# Patient Record
Sex: MALE | Race: WHITE
[De-identification: names, ages, dates, MRNs, and addresses within clinical notes are randomized per-mention and may not be internally consistent; named-entity substitution may affect disease eponyms.]

---

## 2019-07-07 ENCOUNTER — HOSPITAL ENCOUNTER (EMERGENCY)
Dept: HOSPITAL 89 - ER | Age: 69
Discharge: HOME | End: 2019-07-07
Payer: SELF-PAY

## 2019-07-07 VITALS — SYSTOLIC BLOOD PRESSURE: 137 MMHG | DIASTOLIC BLOOD PRESSURE: 99 MMHG

## 2019-07-07 DIAGNOSIS — T23.202A: ICD-10-CM

## 2019-07-07 DIAGNOSIS — T20.29XA: Primary | ICD-10-CM

## 2019-07-07 PROCEDURE — 99283 EMERGENCY DEPT VISIT LOW MDM: CPT

## 2019-07-07 PROCEDURE — 90471 IMMUNIZATION ADMIN: CPT

## 2019-07-07 PROCEDURE — 90715 TDAP VACCINE 7 YRS/> IM: CPT
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
